# Patient Record
Sex: MALE | Race: OTHER | HISPANIC OR LATINO | URBAN - METROPOLITAN AREA
[De-identification: names, ages, dates, MRNs, and addresses within clinical notes are randomized per-mention and may not be internally consistent; named-entity substitution may affect disease eponyms.]

---

## 2019-05-30 ENCOUNTER — EMERGENCY (EMERGENCY)
Facility: HOSPITAL | Age: 39
LOS: 1 days | Discharge: HOME | End: 2019-05-30
Attending: EMERGENCY MEDICINE
Payer: SELF-PAY

## 2019-05-30 VITALS
RESPIRATION RATE: 18 BRPM | SYSTOLIC BLOOD PRESSURE: 115 MMHG | DIASTOLIC BLOOD PRESSURE: 77 MMHG | HEIGHT: 68 IN | OXYGEN SATURATION: 99 % | WEIGHT: 160.06 LBS | TEMPERATURE: 98 F | HEART RATE: 81 BPM

## 2019-05-30 PROCEDURE — 99283 EMERGENCY DEPT VISIT LOW MDM: CPT | Mod: 25

## 2019-05-30 PROCEDURE — 65220 REMOVE FOREIGN BODY FROM EYE: CPT | Mod: LT

## 2019-05-30 PROCEDURE — 90715 TDAP VACCINE 7 YRS/> IM: CPT

## 2019-05-30 PROCEDURE — 99282 EMERGENCY DEPT VISIT SF MDM: CPT | Mod: 25

## 2019-05-30 PROCEDURE — 65220 REMOVE FOREIGN BODY FROM EYE: CPT

## 2019-05-30 PROCEDURE — T1013: CPT

## 2019-05-30 PROCEDURE — 90471 IMMUNIZATION ADMIN: CPT

## 2019-05-30 RX ORDER — TETANUS TOXOID, REDUCED DIPHTHERIA TOXOID AND ACELLULAR PERTUSSIS VACCINE, ADSORBED 5; 2.5; 8; 8; 2.5 [IU]/.5ML; [IU]/.5ML; UG/.5ML; UG/.5ML; UG/.5ML
0.5 SUSPENSION INTRAMUSCULAR ONCE
Refills: 0 | Status: COMPLETED | OUTPATIENT
Start: 2019-05-30 | End: 2019-05-30

## 2019-05-30 RX ORDER — POLYMYXIN B SULF/TRIMETHOPRIM 10000-1/ML
1 DROPS OPHTHALMIC (EYE) ONCE
Refills: 0 | Status: COMPLETED | OUTPATIENT
Start: 2019-05-30 | End: 2019-05-30

## 2019-05-30 RX ADMIN — TETANUS TOXOID, REDUCED DIPHTHERIA TOXOID AND ACELLULAR PERTUSSIS VACCINE, ADSORBED 0.5 MILLILITER(S): 5; 2.5; 8; 8; 2.5 SUSPENSION INTRAMUSCULAR at 21:50

## 2019-05-30 RX ADMIN — Medication 1 DROP(S): at 21:51

## 2019-05-30 NOTE — ED PROVIDER NOTE - OBJECTIVE STATEMENT
40 y/o M, with no pertinent medical hx, presents to the ED c/o left eye pain, onset 2 days ago.  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  Pt states that he was seen at the clinic this afternoon and was told her has a foreign body in his eye.  Pt notes that the clinic was unable to remove the foreign body and was sent to the ED for further evaluation and management.  Denies blurry vision, visual loss, double vision, fever, chill, or discharge from eye.  Unknown last tetanus. 38 y/o M, with no pertinent medical hx, presents to the ED c/o left eye pain, onset 2 days ago.  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  Pt states that he was seen at the clinic this afternoon and was told her has a foreign body in his eye.  Pt notes that the clinic was unable to remove the foreign body and was sent to the ED for further evaluation and management.  Denies blurry vision, visual loss, double vision, fever, chill, or discharge from eye.  Unknown last tetanus.  - Gabriela

## 2019-05-30 NOTE — ED PROVIDER NOTE - PROGRESS NOTE DETAILS
PA NOTE: Pt given polytrim in ed and will give to go home with, pt to f/u with optho, pt agrees, plan for d/c.

## 2019-05-30 NOTE — ED ADULT TRIAGE NOTE - CHIEF COMPLAINT QUOTE
Patient arrived to ED today with c/o left eye pain, redness for the past day.  Patient was told he had a piece of metal in his eye.

## 2019-05-30 NOTE — ED PROVIDER NOTE - ATTENDING CONTRIBUTION TO CARE
I, Steven Martinez, performed a face to face bedside interview with this patient regarding history of present illness, review of symptoms and relevant past medical, social and family history.  I completed an independent physical examination. I have communicated the patient’s plan of care and disposition with the ACP.      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

## 2019-05-30 NOTE — ED PROVIDER NOTE - EYES, MLM
EOMI, PERRL, left conjunctiva injected, 20/20 vision bilaterally, small particle noted within cornea at 4 o'clock position from pupil with surrounding abrasion, wood's lamp with +uptake

## 2019-05-30 NOTE — ED PROCEDURE NOTE - PROCEDURE ADDITIONAL DETAILS
Pt was washed out following procedure, Fluorescein stain shows abrasion and polytrim given to pt with optho follow up Removed with 20 gauge needle, small black foreign body likely debri  Pt was washed out following procedure, Fluorescein stain shows abrasion and polytrim given to pt with optho follow up